# Patient Record
Sex: MALE | URBAN - METROPOLITAN AREA
[De-identification: names, ages, dates, MRNs, and addresses within clinical notes are randomized per-mention and may not be internally consistent; named-entity substitution may affect disease eponyms.]

---

## 2023-01-14 ENCOUNTER — EXTERNAL LAB (OUTPATIENT)
Dept: OTHER | Age: 68
End: 2023-01-14

## 2023-01-14 LAB
APPEARANCE UR: CLEAR
BACTERIA #/AREA URNS HPF: ABNORMAL /HPF
BILIRUB UR QL: NEGATIVE
BUN SERPL-MCNC: 24 MG/DL (ref 7–25)
BUN/CREAT SERPL: ABNORMAL (CALC) (ref 6–22)
CALCIUM SERPL-MCNC: 9.4 MG/DL (ref 8.6–10.3)
CHLORIDE SERPL-SCNC: 103 MMOL/L (ref 98–110)
CO2 SERPL-SCNC: 27 MMOL/L (ref 20–32)
COLOR UR: YELLOW
CREAT SERPL-MCNC: 0.77 MG/DL
CREAT UR-MCNC: 51 MG/DL (ref 20–320)
GFR SERPLBLD SCHWARTZ-ARVRAT: >99 ML/MIN/1.73M2
GLUCOSE SERPL-MCNC: 153 MG/DL (ref 65–99)
GLUCOSE UR-MCNC: ABNORMAL MG/DL
HBA1C MFR BLD: 8 % OF TOTAL HGB
HGB UR QL: NEGATIVE
HYALINE CASTS #/AREA URNS LPF: ABNORMAL /LPF
KETONES UR-MCNC: NEGATIVE MG/DL
LENGTH OF FAST TIME PATIENT: ABNORMAL H
LEUKOCYTE ESTERASE UR QL STRIP: NEGATIVE
MAGNESIUM RBC-SCNC: 6 MG/DL (ref 4–6.4)
MICROALBUMIN UR-MCNC: 0.9 MG/DL
MICROALBUMIN/CREAT UR: 18 MCG/MG CREAT
NITRITE UR QL: NEGATIVE
PH UR: 5.5 [PH] (ref 5–8)
POTASSIUM SERPL-SCNC: 4.3 MMOL/L (ref 3.5–5.3)
PROT UR QL: NEGATIVE
PSA SERPL-MCNC: 2.44 NG/ML
RBC #/AREA URNS HPF: ABNORMAL /HPF
SODIUM SERPL-SCNC: 138 MMOL/L (ref 135–146)
SP GR UR: 1.02 (ref 1–1.03)
SQUAMOUS #/AREA URNS HPF: ABNORMAL /HPF
WBC #/AREA URNS HPF: ABNORMAL /HPF

## 2023-04-15 ENCOUNTER — EXTERNAL LAB (OUTPATIENT)
Dept: OTHER | Age: 68
End: 2023-04-15

## 2023-04-15 LAB
APPEARANCE UR: CLEAR
BACTERIA #/AREA URNS HPF: ABNORMAL /HPF
BACTERIA UR CULT: ABNORMAL
BILIRUB UR QL: NEGATIVE
CASTS #/AREA URNS LPF: ABNORMAL /LPF
COLOR UR: YELLOW
GLUCOSE UR-MCNC: ABNORMAL MG/DL
HBA1C MFR BLD: 8.4 % OF TOTAL HGB
HGB UR QL: NEGATIVE
KETONES UR-MCNC: NEGATIVE MG/DL
LEUKOCYTE ESTERASE UR QL STRIP: NEGATIVE
NITRITE UR QL: NEGATIVE
PH UR: 6.5 [PH] (ref 5–8)
PROT UR QL: NEGATIVE
RBC #/AREA URNS HPF: ABNORMAL /HPF
SP GR UR: 1.02 (ref 1–1.03)
SQUAMOUS #/AREA URNS HPF: ABNORMAL /HPF
WBC #/AREA URNS HPF: ABNORMAL /HPF

## 2023-04-16 LAB — HBA1C MFR BLD: 8.4 %

## 2023-09-30 ENCOUNTER — CLINICAL ABSTRACT (OUTPATIENT)
Dept: CARE COORDINATION | Age: 68
End: 2023-09-30

## 2023-12-07 ENCOUNTER — TELEPHONE (OUTPATIENT)
Dept: OTHER | Age: 68
End: 2023-12-07

## 2023-12-28 ENCOUNTER — TELEPHONE (OUTPATIENT)
Dept: OTHER | Age: 68
End: 2023-12-28

## 2024-02-09 ENCOUNTER — HOSPITAL ENCOUNTER (EMERGENCY)
Facility: HOSPITAL | Age: 69
Discharge: HOME | End: 2024-02-09
Attending: EMERGENCY MEDICINE
Payer: MEDICARE

## 2024-02-09 VITALS
SYSTOLIC BLOOD PRESSURE: 158 MMHG | HEIGHT: 69 IN | RESPIRATION RATE: 18 BRPM | WEIGHT: 186 LBS | TEMPERATURE: 97 F | OXYGEN SATURATION: 96 % | BODY MASS INDEX: 27.55 KG/M2 | DIASTOLIC BLOOD PRESSURE: 86 MMHG | HEART RATE: 97 BPM

## 2024-02-09 DIAGNOSIS — A46 ERYSIPELAS: Primary | ICD-10-CM

## 2024-02-09 PROBLEM — E11.51 TYPE 2 DIABETES MELLITUS WITH DIABETIC PERIPHERAL ANGIOPATHY WITHOUT GANGRENE, WITHOUT LONG-TERM CURRENT USE OF INSULIN  (CMD): Status: ACTIVE | Noted: 2024-02-09

## 2024-02-09 PROBLEM — E78.2 MIXED HYPERLIPIDEMIA: Status: ACTIVE | Noted: 2024-02-09

## 2024-02-09 PROBLEM — I10 PRIMARY HYPERTENSION: Status: ACTIVE | Noted: 2024-02-09

## 2024-02-09 PROCEDURE — 99284 EMERGENCY DEPT VISIT MOD MDM: CPT

## 2024-02-09 PROCEDURE — 99283 EMERGENCY DEPT VISIT LOW MDM: CPT

## 2024-02-09 RX ORDER — CEPHALEXIN 500 MG/1
500 CAPSULE ORAL 4 TIMES DAILY
Qty: 40 CAPSULE | Refills: 0 | Status: SHIPPED | OUTPATIENT
Start: 2024-02-09 | End: 2024-02-19

## 2024-02-09 RX ORDER — CEPHALEXIN 500 MG/1
500 CAPSULE ORAL ONCE
Status: COMPLETED | OUTPATIENT
Start: 2024-02-09 | End: 2024-02-09

## 2024-02-09 NOTE — ED PROVIDER NOTES
Patient Seen in: [facility] Emergency Department      History     Chief Complaint   Patient presents with    Cough/URI    Swelling     Stated Complaint: facial swelling    Subjective:   HPI  68-year-old male with diabetes presenting for evaluation of facial rash and swelling.  The past few days he has had nasal congestion.  Today morning he woke up with a red rash across the nose and cheeks and swelling to this area.  The rash is tender to touch.  No fevers.  No neck stiffness.  No headache.  No numbness tingling or weakness of the arms or legs.      Objective:   History reviewed. No pertinent past medical history.           History reviewed. No pertinent surgical history.             Social History     Socioeconomic History    Marital status:    Tobacco Use    Smoking status: Never    Smokeless tobacco: Never   Substance and Sexual Activity    Alcohol use: Never    Drug use: Never              Review of Systems    Positive for stated complaint: facial swelling  Other systems are as noted in HPI.  Constitutional and vital signs reviewed.      All other systems reviewed and negative except as noted above.    Physical Exam     ED Triage Vitals [02/09/24 0843]   /78   Pulse 99   Resp 18   Temp 97.3 °F (36.3 °C)   Temp src Temporal   SpO2 95 %   O2 Device None (Room air)       Current:/86   Pulse 97   Temp 97.3 °F (36.3 °C) (Temporal)   Resp 18   Ht 175.3 cm (5' 9\")   Wt 84.4 kg   SpO2 96%   BMI 27.47 kg/m²         Physical Exam  Vitals and nursing note reviewed.   Constitutional:       Appearance: He is well-developed.   HENT:      Head: Normocephalic and atraumatic.   Eyes:      Extraocular Movements: Extraocular movements intact.   Cardiovascular:      Rate and Rhythm: Normal rate and regular rhythm.      Heart sounds: Normal heart sounds.   Pulmonary:      Effort: Pulmonary effort is normal.      Breath sounds: Normal breath sounds.   Abdominal:      General: There is no  distension.      Palpations: Abdomen is soft.      Tenderness: There is no abdominal tenderness.   Musculoskeletal:         General: Normal range of motion.      Cervical back: Normal range of motion.   Skin:     General: Skin is warm.      Comments: Well-demarcated raised blanching erythematous rash over the nasal bridge and bilateral cheeks   Neurological:      Mental Status: He is alert.      Comments: No focal deficits       Differential diagnose includes but is not limited to erysipelas, other cellulitis, less likely necrotizing fasciitis, autoimmune malar rash        ED Course   Labs Reviewed - No data to display                   MDM                Medical Decision Making  Patient well-appearing without systemic symptoms of infection.  Symptoms felt to be related to cellulitis or erysipelas and he will be treated with Keflex.  Advised close outpatient follow-up for reevaluation of rash to ensure healing by his PCP.  Patient and family member agreeable with the plan.    Problems Addressed:  Erysipelas: acute illness or injury with systemic symptoms    Risk  Prescription drug management.        Disposition and Plan     Clinical Impression:  1. Erysipelas         Disposition:  Discharge  2/9/2024  9:09 am    Follow-up:  Dr Vargas in 4-5 days    Follow up      We recommend that you schedule follow up care with a primary care provider within the next three months to obtain basic health screening including reassessment of your blood pressure.      Medications Prescribed:  Discharge Medication List as of 2/9/2024  9:18 AM        START taking these medications    Details   cephalexin 500 MG Oral Cap Take 1 capsule (500 mg total) by mouth 4 (four) times daily for 10 days., Print, Disp-40 capsule, R-0

## 2024-02-09 NOTE — DISCHARGE INSTRUCTIONS
Erysipelas is a skin infection involving the dermis layer of the skin, but it may also extend to the superficial cutaneous lymphatics. It is characterized by an area of erythema that is well-demarcated, raised, and often affects the lower extremities, with the face being the second most commonly affected site. Erysipelas is also referred to as “[place]’s Fire” due to its intense fiery rash. It has a rapid and favorable response to antibiotics.

## 2024-02-09 NOTE — ED INITIAL ASSESSMENT (HPI)
Patient arrives ambulatory through triage with complaints of nasal congestion x4 days and then he woke up with left sided facial swelling today.

## 2024-05-15 ENCOUNTER — TELEPHONE (OUTPATIENT)
Dept: OTHER | Age: 69
End: 2024-05-15

## 2024-11-29 ENCOUNTER — TELEPHONE (OUTPATIENT)
Age: 69
End: 2024-11-29

## 2024-12-27 ENCOUNTER — CLINICAL ABSTRACT (OUTPATIENT)
Age: 69
End: 2024-12-27

## 2025-04-22 ENCOUNTER — TELEPHONE (OUTPATIENT)
Age: 70
End: 2025-04-22

## 2025-04-25 ENCOUNTER — TELEPHONE (OUTPATIENT)
Age: 70
End: 2025-04-25